# Patient Record
Sex: FEMALE | Race: WHITE | NOT HISPANIC OR LATINO | URBAN - METROPOLITAN AREA
[De-identification: names, ages, dates, MRNs, and addresses within clinical notes are randomized per-mention and may not be internally consistent; named-entity substitution may affect disease eponyms.]

---

## 2021-03-31 DIAGNOSIS — Z23 ENCOUNTER FOR IMMUNIZATION: ICD-10-CM

## 2021-04-08 ENCOUNTER — IMMUNIZATIONS (OUTPATIENT)
Dept: FAMILY MEDICINE CLINIC | Facility: HOSPITAL | Age: 61
End: 2021-04-08

## 2021-04-08 DIAGNOSIS — Z23 ENCOUNTER FOR IMMUNIZATION: Primary | ICD-10-CM

## 2021-04-08 PROCEDURE — 0001A SARS-COV-2 / COVID-19 MRNA VACCINE (PFIZER-BIONTECH) 30 MCG: CPT

## 2021-04-08 PROCEDURE — 91300 SARS-COV-2 / COVID-19 MRNA VACCINE (PFIZER-BIONTECH) 30 MCG: CPT

## 2021-05-01 ENCOUNTER — IMMUNIZATIONS (OUTPATIENT)
Dept: FAMILY MEDICINE CLINIC | Facility: HOSPITAL | Age: 61
End: 2021-05-01

## 2021-05-01 DIAGNOSIS — Z23 ENCOUNTER FOR IMMUNIZATION: Primary | ICD-10-CM

## 2021-05-01 PROCEDURE — 0002A SARS-COV-2 / COVID-19 MRNA VACCINE (PFIZER-BIONTECH) 30 MCG: CPT

## 2021-05-01 PROCEDURE — 91300 SARS-COV-2 / COVID-19 MRNA VACCINE (PFIZER-BIONTECH) 30 MCG: CPT

## 2021-06-23 ENCOUNTER — TELEPHONE (OUTPATIENT)
Dept: GASTROENTEROLOGY | Facility: CLINIC | Age: 61
End: 2021-06-23

## 2023-02-20 ENCOUNTER — APPOINTMENT (OUTPATIENT)
Dept: LAB | Facility: CLINIC | Age: 63
End: 2023-02-20

## 2023-02-20 DIAGNOSIS — I51.9 MYXEDEMA HEART DISEASE: ICD-10-CM

## 2023-02-20 DIAGNOSIS — Z01.818 OTHER SPECIFIED PRE-OPERATIVE EXAMINATION: ICD-10-CM

## 2023-02-20 DIAGNOSIS — E55.9 VITAMIN D DEFICIENCY DISEASE: ICD-10-CM

## 2023-02-20 DIAGNOSIS — E03.9 MYXEDEMA HEART DISEASE: ICD-10-CM

## 2023-02-20 DIAGNOSIS — E53.8 BIOTIN-(PROPIONYL-COA-CARBOXYLASE) LIGASE DEFICIENCY: ICD-10-CM

## 2023-02-20 DIAGNOSIS — I10 ESSENTIAL HYPERTENSION, BENIGN: ICD-10-CM

## 2023-02-20 LAB
25(OH)D3 SERPL-MCNC: 21.7 NG/ML (ref 30–100)
ALBUMIN SERPL BCP-MCNC: 3.8 G/DL (ref 3.5–5)
ALP SERPL-CCNC: 86 U/L (ref 46–116)
ALT SERPL W P-5'-P-CCNC: 20 U/L (ref 12–78)
ANION GAP SERPL CALCULATED.3IONS-SCNC: 5 MMOL/L (ref 4–13)
AST SERPL W P-5'-P-CCNC: 17 U/L (ref 5–45)
BASOPHILS # BLD AUTO: 0.05 THOUSANDS/ÂΜL (ref 0–0.1)
BASOPHILS NFR BLD AUTO: 1 % (ref 0–1)
BILIRUB SERPL-MCNC: 0.63 MG/DL (ref 0.2–1)
BUN SERPL-MCNC: 16 MG/DL (ref 5–25)
CALCIUM SERPL-MCNC: 9.9 MG/DL (ref 8.3–10.1)
CHLORIDE SERPL-SCNC: 105 MMOL/L (ref 96–108)
CHOLEST SERPL-MCNC: 237 MG/DL
CO2 SERPL-SCNC: 28 MMOL/L (ref 21–32)
CREAT SERPL-MCNC: 1.31 MG/DL (ref 0.6–1.3)
EOSINOPHIL # BLD AUTO: 0.26 THOUSAND/ÂΜL (ref 0–0.61)
EOSINOPHIL NFR BLD AUTO: 4 % (ref 0–6)
ERYTHROCYTE [DISTWIDTH] IN BLOOD BY AUTOMATED COUNT: 14.2 % (ref 11.6–15.1)
GFR SERPL CREATININE-BSD FRML MDRD: 44 ML/MIN/1.73SQ M
GLUCOSE P FAST SERPL-MCNC: 83 MG/DL (ref 65–99)
HCT VFR BLD AUTO: 40.7 % (ref 34.8–46.1)
HDLC SERPL-MCNC: 55 MG/DL
HGB BLD-MCNC: 12.8 G/DL (ref 11.5–15.4)
IMM GRANULOCYTES # BLD AUTO: 0.01 THOUSAND/UL (ref 0–0.2)
IMM GRANULOCYTES NFR BLD AUTO: 0 % (ref 0–2)
LDLC SERPL CALC-MCNC: 156 MG/DL (ref 0–100)
LYMPHOCYTES # BLD AUTO: 1.73 THOUSANDS/ÂΜL (ref 0.6–4.47)
LYMPHOCYTES NFR BLD AUTO: 30 % (ref 14–44)
MCH RBC QN AUTO: 28.3 PG (ref 26.8–34.3)
MCHC RBC AUTO-ENTMCNC: 31.4 G/DL (ref 31.4–37.4)
MCV RBC AUTO: 90 FL (ref 82–98)
MONOCYTES # BLD AUTO: 0.36 THOUSAND/ÂΜL (ref 0.17–1.22)
MONOCYTES NFR BLD AUTO: 6 % (ref 4–12)
NEUTROPHILS # BLD AUTO: 3.44 THOUSANDS/ÂΜL (ref 1.85–7.62)
NEUTS SEG NFR BLD AUTO: 59 % (ref 43–75)
NONHDLC SERPL-MCNC: 182 MG/DL
NRBC BLD AUTO-RTO: 0 /100 WBCS
PLATELET # BLD AUTO: 308 THOUSANDS/UL (ref 149–390)
PMV BLD AUTO: 10.7 FL (ref 8.9–12.7)
POTASSIUM SERPL-SCNC: 4.3 MMOL/L (ref 3.5–5.3)
PROT SERPL-MCNC: 7.3 G/DL (ref 6.4–8.4)
RBC # BLD AUTO: 4.53 MILLION/UL (ref 3.81–5.12)
SODIUM SERPL-SCNC: 138 MMOL/L (ref 135–147)
TRIGL SERPL-MCNC: 128 MG/DL
TSH SERPL DL<=0.05 MIU/L-ACNC: 2.96 UIU/ML (ref 0.45–4.5)
VIT B12 SERPL-MCNC: 291 PG/ML (ref 100–900)
WBC # BLD AUTO: 5.85 THOUSAND/UL (ref 4.31–10.16)

## 2024-08-16 ENCOUNTER — APPOINTMENT (OUTPATIENT)
Dept: LAB | Facility: CLINIC | Age: 64
End: 2024-08-16
Payer: COMMERCIAL

## 2024-08-16 DIAGNOSIS — I10 ESSENTIAL HYPERTENSION, MALIGNANT: ICD-10-CM

## 2024-08-16 DIAGNOSIS — E55.9 AVITAMINOSIS D: ICD-10-CM

## 2024-08-16 DIAGNOSIS — N39.0 URINARY TRACT INFECTION WITHOUT HEMATURIA, SITE UNSPECIFIED: ICD-10-CM

## 2024-08-16 DIAGNOSIS — I51.9 MYXEDEMA HEART DISEASE: ICD-10-CM

## 2024-08-16 DIAGNOSIS — E03.9 MYXEDEMA HEART DISEASE: ICD-10-CM

## 2024-08-16 DIAGNOSIS — E53.8 BIOTIN-(PROPIONYL-COA-CARBOXYLASE) LIGASE DEFICIENCY: ICD-10-CM

## 2024-08-16 DIAGNOSIS — E78.5 HYPERLIPIDEMIA, UNSPECIFIED HYPERLIPIDEMIA TYPE: ICD-10-CM

## 2024-08-16 LAB
25(OH)D3 SERPL-MCNC: 80.9 NG/ML (ref 30–100)
ALBUMIN SERPL BCG-MCNC: 4.2 G/DL (ref 3.5–5)
ALP SERPL-CCNC: 86 U/L (ref 34–104)
ALT SERPL W P-5'-P-CCNC: 21 U/L (ref 7–52)
ANION GAP SERPL CALCULATED.3IONS-SCNC: 7 MMOL/L (ref 4–13)
AST SERPL W P-5'-P-CCNC: 23 U/L (ref 13–39)
BACTERIA UR QL AUTO: ABNORMAL /HPF
BILIRUB SERPL-MCNC: 0.64 MG/DL (ref 0.2–1)
BILIRUB UR QL STRIP: NEGATIVE
BUN SERPL-MCNC: 21 MG/DL (ref 5–25)
CALCIUM SERPL-MCNC: 10.1 MG/DL (ref 8.4–10.2)
CHLORIDE SERPL-SCNC: 102 MMOL/L (ref 96–108)
CHOLEST SERPL-MCNC: 144 MG/DL
CLARITY UR: CLEAR
CO2 SERPL-SCNC: 31 MMOL/L (ref 21–32)
COLOR UR: ABNORMAL
CREAT SERPL-MCNC: 1.18 MG/DL (ref 0.6–1.3)
GFR SERPL CREATININE-BSD FRML MDRD: 49 ML/MIN/1.73SQ M
GLUCOSE P FAST SERPL-MCNC: 78 MG/DL (ref 65–99)
GLUCOSE UR STRIP-MCNC: NEGATIVE MG/DL
HDLC SERPL-MCNC: 57 MG/DL
HGB UR QL STRIP.AUTO: ABNORMAL
KETONES UR STRIP-MCNC: NEGATIVE MG/DL
LDLC SERPL CALC-MCNC: 58 MG/DL (ref 0–100)
LEUKOCYTE ESTERASE UR QL STRIP: NEGATIVE
MUCOUS THREADS UR QL AUTO: ABNORMAL
NITRITE UR QL STRIP: NEGATIVE
NON-SQ EPI CELLS URNS QL MICRO: ABNORMAL /HPF
NONHDLC SERPL-MCNC: 87 MG/DL
PH UR STRIP.AUTO: 6 [PH]
POTASSIUM SERPL-SCNC: 3.6 MMOL/L (ref 3.5–5.3)
PROT SERPL-MCNC: 7.3 G/DL (ref 6.4–8.4)
PROT UR STRIP-MCNC: ABNORMAL MG/DL
RBC #/AREA URNS AUTO: ABNORMAL /HPF
SODIUM SERPL-SCNC: 140 MMOL/L (ref 135–147)
SP GR UR STRIP.AUTO: 1.02 (ref 1–1.03)
TRIGL SERPL-MCNC: 145 MG/DL
TSH SERPL DL<=0.05 MIU/L-ACNC: 1.86 UIU/ML (ref 0.45–4.5)
UROBILINOGEN UR STRIP-ACNC: <2 MG/DL
VIT B12 SERPL-MCNC: 849 PG/ML (ref 180–914)
WBC #/AREA URNS AUTO: ABNORMAL /HPF

## 2024-08-16 PROCEDURE — 84443 ASSAY THYROID STIM HORMONE: CPT

## 2024-08-16 PROCEDURE — 82607 VITAMIN B-12: CPT

## 2024-08-16 PROCEDURE — 36415 COLL VENOUS BLD VENIPUNCTURE: CPT

## 2024-08-16 PROCEDURE — 80053 COMPREHEN METABOLIC PANEL: CPT

## 2024-08-16 PROCEDURE — 80061 LIPID PANEL: CPT

## 2024-08-16 PROCEDURE — 81001 URINALYSIS AUTO W/SCOPE: CPT

## 2024-08-16 PROCEDURE — 82306 VITAMIN D 25 HYDROXY: CPT

## 2024-11-13 ENCOUNTER — APPOINTMENT (OUTPATIENT)
Dept: LAB | Facility: CLINIC | Age: 64
End: 2024-11-13
Payer: COMMERCIAL

## 2024-11-13 DIAGNOSIS — E78.5 HYPERLIPIDEMIA, UNSPECIFIED HYPERLIPIDEMIA TYPE: ICD-10-CM

## 2024-11-13 DIAGNOSIS — I10 ESSENTIAL HYPERTENSION, MALIGNANT: ICD-10-CM

## 2024-11-13 LAB
ALBUMIN SERPL BCG-MCNC: 3.8 G/DL (ref 3.5–5)
ALP SERPL-CCNC: 61 U/L (ref 34–104)
ALT SERPL W P-5'-P-CCNC: 15 U/L (ref 7–52)
ANION GAP SERPL CALCULATED.3IONS-SCNC: 7 MMOL/L (ref 4–13)
AST SERPL W P-5'-P-CCNC: 21 U/L (ref 13–39)
BILIRUB SERPL-MCNC: 0.61 MG/DL (ref 0.2–1)
BUN SERPL-MCNC: 17 MG/DL (ref 5–25)
CALCIUM SERPL-MCNC: 9.1 MG/DL (ref 8.4–10.2)
CHLORIDE SERPL-SCNC: 103 MMOL/L (ref 96–108)
CHOLEST SERPL-MCNC: 109 MG/DL (ref ?–200)
CO2 SERPL-SCNC: 31 MMOL/L (ref 21–32)
CREAT SERPL-MCNC: 1.27 MG/DL (ref 0.6–1.3)
GFR SERPL CREATININE-BSD FRML MDRD: 45 ML/MIN/1.73SQ M
GLUCOSE P FAST SERPL-MCNC: 94 MG/DL (ref 65–99)
HDLC SERPL-MCNC: 43 MG/DL
LDLC SERPL CALC-MCNC: 45 MG/DL (ref 0–100)
NONHDLC SERPL-MCNC: 66 MG/DL
POTASSIUM SERPL-SCNC: 4 MMOL/L (ref 3.5–5.3)
PROT SERPL-MCNC: 6.1 G/DL (ref 6.4–8.4)
SODIUM SERPL-SCNC: 141 MMOL/L (ref 135–147)
TRIGL SERPL-MCNC: 103 MG/DL (ref ?–150)

## 2024-11-13 PROCEDURE — 80061 LIPID PANEL: CPT

## 2024-11-13 PROCEDURE — 36415 COLL VENOUS BLD VENIPUNCTURE: CPT

## 2024-11-13 PROCEDURE — 80053 COMPREHEN METABOLIC PANEL: CPT

## 2025-01-15 ENCOUNTER — TELEPHONE (OUTPATIENT)
Dept: GASTROENTEROLOGY | Facility: CLINIC | Age: 65
End: 2025-01-15

## 2025-01-15 ENCOUNTER — CONSULT (OUTPATIENT)
Dept: GASTROENTEROLOGY | Facility: CLINIC | Age: 65
End: 2025-01-15
Payer: COMMERCIAL

## 2025-01-15 VITALS
BODY MASS INDEX: 29.18 KG/M2 | DIASTOLIC BLOOD PRESSURE: 86 MMHG | SYSTOLIC BLOOD PRESSURE: 184 MMHG | HEIGHT: 66 IN | WEIGHT: 181.6 LBS

## 2025-01-15 DIAGNOSIS — K21.9 GASTROESOPHAGEAL REFLUX DISEASE, UNSPECIFIED WHETHER ESOPHAGITIS PRESENT: Primary | ICD-10-CM

## 2025-01-15 DIAGNOSIS — Z12.11 SCREENING FOR COLON CANCER: ICD-10-CM

## 2025-01-15 DIAGNOSIS — K44.9 HIATAL HERNIA: ICD-10-CM

## 2025-01-15 PROCEDURE — 99204 OFFICE O/P NEW MOD 45 MIN: CPT | Performed by: INTERNAL MEDICINE

## 2025-01-15 RX ORDER — LOSARTAN POTASSIUM 100 MG/1
100 TABLET ORAL DAILY
COMMUNITY

## 2025-01-15 RX ORDER — SODIUM CHLORIDE, SODIUM LACTATE, POTASSIUM CHLORIDE, CALCIUM CHLORIDE 600; 310; 30; 20 MG/100ML; MG/100ML; MG/100ML; MG/100ML
125 INJECTION, SOLUTION INTRAVENOUS CONTINUOUS
OUTPATIENT
Start: 2025-01-15

## 2025-01-15 RX ORDER — AMLODIPINE BESYLATE 2.5 MG/1
2.5 TABLET ORAL DAILY
COMMUNITY

## 2025-01-15 RX ORDER — FAMOTIDINE 20 MG/1
20 TABLET, FILM COATED ORAL DAILY
COMMUNITY

## 2025-01-15 RX ORDER — MUPIROCIN 20 MG/G
OINTMENT TOPICAL
COMMUNITY
Start: 2024-12-17

## 2025-01-15 RX ORDER — POLYETHYLENE GLYCOL 3350, SODIUM SULFATE ANHYDROUS, SODIUM BICARBONATE, SODIUM CHLORIDE, POTASSIUM CHLORIDE 236; 22.74; 6.74; 5.86; 2.97 G/4L; G/4L; G/4L; G/4L; G/4L
4000 POWDER, FOR SOLUTION ORAL ONCE
Qty: 4000 ML | Refills: 0 | Status: SHIPPED | OUTPATIENT
Start: 2025-01-15 | End: 2025-01-15

## 2025-01-15 NOTE — TELEPHONE ENCOUNTER
Scheduled date of combo (as of today): 2/26/25  Physician performing combo: BERRY  Location of combo: [facility]  Bowel prep reviewed with patient: Golytely  Instructions reviewed with patient by: ESTEBAN  Clearances: N

## 2025-01-15 NOTE — PROGRESS NOTES
Name: Nick Mcknight      : 1960      MRN: [id number]  Encounter Provider: Danny Muñoz DO  Encounter Date: 1/15/2025   Encounter department: [facility]  :  Assessment & Plan  Gastroesophageal reflux disease, unspecified whether esophagitis present  Longstanding complaint.  Initially took an H2 blocker, added omeprazole 4 or 5 years ago.  Tried to stop omeprazole in the setting of increased creatinine, but had immediate and severe recurrence of symptoms    Will plan upper endoscopy to assess for erosive esophagitis and Alves's.  Pending results we can attempt tapering off the PPI, likely with overlapping famotidine 40 mg twice daily    Hiatal hernia  Identified on recent chest x-ray.  Will assess further on upcoming EGD       Screening for colon cancer  No prior colon cancer screening.  Had flex sig at age 15 for constipation  Orders:    Colonoscopy; Future    polyethylene glycol (Golytely) 4000 mL solution; Take 4,000 mL by mouth once for 1 dose Take 4000 mL by mouth once for 1 dose. Use as directed        History of Present Illness   HPI  Nick Mcknight is a 64 y.o. female who presents for evaluation of chronic reflux and to discuss colon cancer screening.  She has had heartburn for several years.  She was initially treated with Pepcid then transition to omeprazole for 5 years ago.  About 3 years ago she added a bedtime dose of famotidine.  When on these medications symptoms are well-controlled.  Prior labs showed an elevated creatinine show she tried stopping the omeprazole but had immediate flare of symptoms with regurgitation and vomiting.  Symptoms were more severe than those that prompted the medication initially.  She is now back on the meds and asymptomatic.  She avoids acidic foods and avoid laying down shortly after eating.  Chest x-ray 2024 showed a moderate-sized hiatal hernia without obstruction noted    She reports lifelong issues  "with chronic constipation.  Increasing fluid intake did not help.  She currently takes a stool softener daily and a laxative every few weeks with good results.  She denies any rectal bleeding.    History obtained from: patient    Review of Systems   All other systems reviewed and are negative.    Medical History Reviewed by provider this encounter:     .  Current Outpatient Medications on File Prior to Visit   Medication Sig Dispense Refill    amLODIPine (NORVASC) 2.5 mg tablet Take 2.5 mg by mouth daily      famotidine (PEPCID) 20 mg tablet Take 20 mg by mouth daily      losartan (COZAAR) 100 MG tablet Take 100 mg by mouth daily      mupirocin (BACTROBAN) 2 % ointment PLEASE SEE ATTACHED FOR DETAILED DIRECTIONS      omeprazole (PriLOSEC) 20 mg delayed release capsule        No current facility-administered medications on file prior to visit.         Objective   BP (!) 184/86   Ht 5' 6\" (1.676 m)   Wt 82.4 kg (181 lb 9.6 oz)   BMI 29.31 kg/m²      Physical Exam  Constitutional:       Appearance: Normal appearance.   HENT:      Head: Normocephalic and atraumatic.      Nose: Nose normal.   Eyes:      Extraocular Movements: Extraocular movements intact.      Conjunctiva/sclera: Conjunctivae normal.   Cardiovascular:      Rate and Rhythm: Normal rate and regular rhythm.   Pulmonary:      Effort: Pulmonary effort is normal.      Breath sounds: Normal breath sounds.   Abdominal:      General: Abdomen is flat. Bowel sounds are normal. There is no distension.      Palpations: Abdomen is soft. There is no mass.      Tenderness: There is no guarding.   Musculoskeletal:         General: Normal range of motion.      Cervical back: Normal range of motion.   Skin:     General: Skin is warm and dry.   Neurological:      General: No focal deficit present.      Mental Status: She is alert.   Psychiatric:         Mood and Affect: Mood normal.         Behavior: Behavior normal.           "

## 2025-01-15 NOTE — PROGRESS NOTES
PT Evaluation     Today's date: 2025  Patient name: Nick Mcknight  : 1960  MRN: [id number]  Referring provider: Alejandro Tucker MD  Dx:   Encounter Diagnosis     ICD-10-CM    1. Osteoarthritis of right knee, unspecified osteoarthritis type  M17.11           Start Time: 0730  Stop Time: 0810  Total time in clinic (min): 40 minutes    Assessment  Impairments: abnormal gait, abnormal muscle firing, abnormal or restricted ROM, abnormal movement, activity intolerance, impaired physical strength, pain with function, poor body mechanics and activity limitations    Assessment details: Patient is a 64 y.o. female presenting prior to scheduled R TKA with date of surgery 25. Completed preoperative examination and established initial HEP with goal of maximizing mobility and strength in order to improve postoperative outcome. Answered all patient questions to satisfaction and educated patient regarding surgical procedure, prognosis, and plan of care. Plan to resume treatment postoperatively.         Prognosis: good    Goals  Impairment Goals: 4-6 weeks  - Patient to decrease pain to 0/10  - Patient to improve knee PROM to WFL  - Patient to increase knee strength to 4/5 throughout  - Patient to increase hip strength to 4/5 throughout    Functional Goals: by discharge  - Patient to discharge to independent HEP  - Patient to improve knee AROM to WFL  - Patient to improve subjective functional level to 80%  - Patient to ambulate in home and community without increased pain or difficulty   - Patient to ascend and descend stairs without increased pain or difficulty  - Patient to complete sit to stand transfers without increased pain or difficulty  - Patient to return to work      Plan  Patient would benefit from: skilled physical therapy  Planned modality interventions: cryotherapy, TENS and thermotherapy: hydrocollator packs    Planned therapy interventions: flexibility, home exercise program, joint mobilization,  manual therapy, neuromuscular re-education, patient education, strengthening, stretching, therapeutic activities, therapeutic exercise and functional ROM exercises    Treatment plan discussed with: patient  Plan details: Resume treatment postoperatively        Subjective Evaluation    History of Present Illness  Mechanism of injury: HISTORY OF PRESENT ILLNESS: Patient presents prior to scheduled R TKA 25. She had completed prior conservative treatment including CSI and gel injections.   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)  FUNCTIONAL LIMITATIONS: stair navigation, carrying, sit to stand from low chairs  SUBJECTIVE FUNCTIONAL LEVEL: 60%  PATIENT GOAL: I want to get back to normal life  Pain  Current pain ratin  At best pain ratin  At worst pain ratin  Location: R knee          Objective     Active Range of Motion   Left Knee   Hyperextension  Flexion: 125 degrees   Extension: 5 degrees     Right Knee   Hyperextension   Flexion: 118 degrees   Extension: 1 degrees     Strength/Myotome Testing     Left Hip   Planes of Motion   Flexion: 5  Abduction: 4+    Right Hip   Planes of Motion   Flexion: 5  Abduction: 4+    Left Knee   Flexion: 4+  Extension: 4+    Right Knee   Flexion: 4+  Extension: 4+    Left Ankle/Foot   Dorsiflexion: 5    Right Ankle/Foot   Dorsiflexion: 5             Diagnosis: R TKA 25   Precautions: HTN   Primary impairments: R knee AROM/PROM deficits, R knee strength deficits, and gait dysfunction   *asterisks by exercise = given for HEP           Manuals        R knee PROM        Tibiofemoral and patellar mobilizations                                There Ex        Rec bike        Heel slides        Gastroc strap stretch        Supine knee ext prop                                                Neuro Re-Ed         Quad sets        SAQ        Supine SLR *  X 20       Bridges        S/L hip abd *  X 20       Prone hip ext        Heel/toe raises        Band TKE        Mini squats                                Re-evaluation             Ther Act/Gait                                         Modalities             CP prn.

## 2025-01-16 ENCOUNTER — EVALUATION (OUTPATIENT)
Dept: PHYSICAL THERAPY | Facility: REHABILITATION | Age: 65
End: 2025-01-16
Payer: COMMERCIAL

## 2025-01-16 DIAGNOSIS — M17.11 OSTEOARTHRITIS OF RIGHT KNEE, UNSPECIFIED OSTEOARTHRITIS TYPE: Primary | ICD-10-CM

## 2025-01-16 PROCEDURE — 97161 PT EVAL LOW COMPLEX 20 MIN: CPT | Performed by: PHYSICAL THERAPIST

## 2025-01-16 PROCEDURE — 97112 NEUROMUSCULAR REEDUCATION: CPT | Performed by: PHYSICAL THERAPIST

## 2025-01-16 NOTE — LETTER
2025    Alejandro Tucker MD  [address] [place] [address]    Patient: Nick Mcknight   YOB: 1960   Date of Visit: 2025     Encounter Diagnosis     ICD-10-CM    1. Osteoarthritis of right knee, unspecified osteoarthritis type  M17.11           Dear Dr. Tucker:    Thank you for your recent referral of Nick Mcknight. Please review the attached evaluation summary from Nick's recent visit.     Please verify that you agree with the plan of care by signing the attached order.     If you have any questions or concerns, please do not hesitate to call.     I sincerely appreciate the opportunity to share in the care of one of your patients and hope to have another opportunity to work with you in the near future.       Sincerely,    Harry Gómez, PT      Referring Provider:      I certify that I have read the below Plan of Care and certify the need for these services furnished under this plan of treatment while under my care.                    Alejandro Tucker MD  [address] [place] [address]  Via Fax: [phone]          PT Evaluation     Today's date: 2025  Patient name: Nick Mcknight  : 1960  MRN: [id number]  Referring provider: Alejandro Tucker MD  Dx:   Encounter Diagnosis     ICD-10-CM    1. Osteoarthritis of right knee, unspecified osteoarthritis type  M17.11           Start Time: 0730  Stop Time: 0810  Total time in clinic (min): 40 minutes    Assessment  Impairments: abnormal gait, abnormal muscle firing, abnormal or restricted ROM, abnormal movement, activity intolerance, impaired physical strength, pain with function, poor body mechanics and activity limitations    Assessment details: Patient is a 64 y.o. female presenting prior to scheduled R TKA with date of surgery 25. Completed preoperative examination and established initial HEP with goal of maximizing mobility and strength in order to improve postoperative outcome. Answered all patient  questions to satisfaction and educated patient regarding surgical procedure, prognosis, and plan of care. Plan to resume treatment postoperatively.         Prognosis: good    Goals  Impairment Goals: 4-6 weeks  - Patient to decrease pain to 0/10  - Patient to improve knee PROM to WFL  - Patient to increase knee strength to 4/5 throughout  - Patient to increase hip strength to 4/5 throughout    Functional Goals: by discharge  - Patient to discharge to independent HEP  - Patient to improve knee AROM to WFL  - Patient to improve subjective functional level to 80%  - Patient to ambulate in home and community without increased pain or difficulty   - Patient to ascend and descend stairs without increased pain or difficulty  - Patient to complete sit to stand transfers without increased pain or difficulty  - Patient to return to work      Plan  Patient would benefit from: skilled physical therapy  Planned modality interventions: cryotherapy, TENS and thermotherapy: hydrocollator packs    Planned therapy interventions: flexibility, home exercise program, joint mobilization, manual therapy, neuromuscular re-education, patient education, strengthening, stretching, therapeutic activities, therapeutic exercise and functional ROM exercises    Treatment plan discussed with: patient  Plan details: Resume treatment postoperatively        Subjective Evaluation    History of Present Illness  Mechanism of injury: HISTORY OF PRESENT ILLNESS: Patient presents prior to scheduled R TKA 1/20/25. She had completed prior conservative treatment including CSI and gel injections.   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)  FUNCTIONAL LIMITATIONS: stair navigation, carrying, sit to stand from low chairs  SUBJECTIVE FUNCTIONAL LEVEL: 60%  PATIENT GOAL: I want to get back to normal  life  Pain  Current pain ratin  At best pain ratin  At worst pain ratin  Location: R knee          Objective     Active Range of Motion   Left Knee   Hyperextension  Flexion: 125 degrees   Extension: 5 degrees     Right Knee   Hyperextension   Flexion: 118 degrees   Extension: 1 degrees     Strength/Myotome Testing     Left Hip   Planes of Motion   Flexion: 5  Abduction: 4+    Right Hip   Planes of Motion   Flexion: 5  Abduction: 4+    Left Knee   Flexion: 4+  Extension: 4+    Right Knee   Flexion: 4+  Extension: 4+    Left Ankle/Foot   Dorsiflexion: 5    Right Ankle/Foot   Dorsiflexion: 5             Diagnosis: R TKA 25   Precautions: HTN   Primary impairments: R knee AROM/PROM deficits, R knee strength deficits, and gait dysfunction   *asterisks by exercise = given for HEP           Manuals        R knee PROM        Tibiofemoral and patellar mobilizations                                There Ex        Rec bike        Heel slides        Gastroc strap stretch        Supine knee ext prop                                                Neuro Re-Ed        Quad sets        SAQ        Supine SLR *  X 20       Bridges        S/L hip abd *  X 20       Prone hip ext        Heel/toe raises        Band TKE        Mini squats                                Re-evaluation             Ther Act/Gait                                         Modalities             CP prn.

## 2025-01-22 ENCOUNTER — OFFICE VISIT (OUTPATIENT)
Dept: PHYSICAL THERAPY | Facility: REHABILITATION | Age: 65
End: 2025-01-22
Payer: COMMERCIAL

## 2025-01-22 DIAGNOSIS — M17.11 OSTEOARTHRITIS OF RIGHT KNEE, UNSPECIFIED OSTEOARTHRITIS TYPE: Primary | ICD-10-CM

## 2025-01-22 DIAGNOSIS — Z96.651 HISTORY OF ARTHROPLASTY OF RIGHT KNEE: ICD-10-CM

## 2025-01-22 PROCEDURE — 97110 THERAPEUTIC EXERCISES: CPT | Performed by: PHYSICAL THERAPIST

## 2025-01-22 PROCEDURE — 97164 PT RE-EVAL EST PLAN CARE: CPT | Performed by: PHYSICAL THERAPIST

## 2025-01-22 NOTE — PROGRESS NOTES
PT Evaluation     Today's date: 2025  Patient name: Nick Mcknight  : 1960  MRN: [id number]  Referring provider: Alejandro Tucker MD  Dx:   Encounter Diagnosis     ICD-10-CM    1. Osteoarthritis of right knee, unspecified osteoarthritis type  M17.11       2. History of arthroplasty of right knee  Z96.651           Start Time: 1705  Stop Time: 1730  Total time in clinic (min): 25 minutes    Assessment  Impairments: abnormal gait, abnormal muscle firing, abnormal or restricted ROM, abnormal movement, activity intolerance, impaired physical strength, pain with function, poor body mechanics and activity limitations    Assessment details: Patient is a 64 y.o. female presenting s/p R TKA with date of surgery 25. Resulting postoperative impairments include R knee AROM/PROM deficits, R knee strength deficits, and gait dysfunction. As a result of impairments patient experiences limitations with functional/daily activities including ambulating with RW, step to step stair navigation, unable to drive, out of work, sit to stand transfers, car transfers, and playing with grandchildren. Precautions, surgical protocol, and signs/symptoms of infection were reviewed with patient who expressed verbal understanding. Patient has the above listed impairments and will benefit from skilled PT to improve deficits to return to prior level of function.           Prognosis: good    Goals  Impairment Goals: 4-6 weeks  - Patient to decrease pain to 0/10  - Patient to improve knee PROM to WFL  - Patient to increase knee strength to 4/5 throughout  - Patient to increase hip strength to 4/5 throughout    Functional Goals: by discharge  - Patient to discharge to independent [facility]  - Patient to improve knee AROM to WFL  - Patient to improve subjective functional level to 80%  - Patient to ambulate in home and community without increased pain or difficulty   - Patient to ascend and descend stairs without increased pain or difficulty  -  Patient to complete sit to stand transfers without increased pain or difficulty  - Patient to return to playing with grandchildren      Plan  Patient would benefit from: skilled physical therapy  Planned modality interventions: cryotherapy, TENS and thermotherapy: hydrocollator packs    Planned therapy interventions: flexibility, home exercise program, joint mobilization, manual therapy, neuromuscular re-education, patient education, strengthening, stretching, therapeutic activities, therapeutic exercise and functional ROM exercises    Frequency: 2x week  Duration in weeks: 8  Treatment plan discussed with: patient      Subjective Evaluation    History of Present Illness  Mechanism of injury: HISTORY OF PRESENT ILLNESS: Patient presents s/p R TKA 25. She was discharged home next day and is ambulating with RW.   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)  FUNCTIONAL LIMITATIONS: ambulating with RW, step to step stair navigation, unable to drive, out of work, sit to stand transfers, car transfers, and playing with grandchildren  SUBJECTIVE FUNCTIONAL LEVEL: 40%  PATIENT GOAL: I want to get back to normal life  Pain  Current pain ratin  At best pain ratin  At worst pain rating: 10  Location: R knee        Objective     Active Range of Motion   Left Knee   Hyperextension  Flexion: 125 degrees   Extension: 5 degrees     Right Knee   Flexion: 77 degrees   Extension: -5 degrees     Strength/Myotome Testing     Left Hip   Planes of Motion   Flexion: 5  Abduction: 4+    Left Knee   Flexion: 4+  Extension: 4+    Right Knee   Quadriceps contraction: fair    Left Ankle/Foot   Dorsiflexion: 5    Right Ankle/Foot   Dorsiflexion: 5      Flowsheet Rows      Flowsheet Row Most Recent Value   PT/OT G-Codes    Current Score 52   Projected Score 76               Diagnosis: s/p R TKA  "1/20/25   Precautions: HTN   Primary impairments: R knee AROM/PROM deficits, R knee strength deficits, and gait dysfunction   *asterisks by exercise = given for HEP    1/16 1/22      Manuals        R knee PROM        Tibiofemoral and patellar mobilizations                                There Ex        Rec bike         Heel slides *   5\" x 10      Gastroc strap stretch        Supine knee ext prop                                                Neuro Re-Ed        Quad sets *   5\" x 10      SAQ        Supine SLR  X 20       Bridges        S/L hip abd  X 20       Prone hip ext        Heel/toe raises        Band TKE        Mini squats                                Re-evaluation    CM         Ther Act/Gait                                         Modalities             CP prn.   At home                                           "

## 2025-01-27 ENCOUNTER — OFFICE VISIT (OUTPATIENT)
Dept: PHYSICAL THERAPY | Facility: REHABILITATION | Age: 65
End: 2025-01-27
Payer: COMMERCIAL

## 2025-01-27 DIAGNOSIS — M17.11 OSTEOARTHRITIS OF RIGHT KNEE, UNSPECIFIED OSTEOARTHRITIS TYPE: Primary | ICD-10-CM

## 2025-01-27 DIAGNOSIS — Z96.651 HISTORY OF ARTHROPLASTY OF RIGHT KNEE: ICD-10-CM

## 2025-01-27 PROCEDURE — 97140 MANUAL THERAPY 1/> REGIONS: CPT

## 2025-01-27 PROCEDURE — 97110 THERAPEUTIC EXERCISES: CPT

## 2025-01-27 NOTE — PROGRESS NOTES
"Daily Note     Today's date: 2025  Patient name: Nick Mcknight  : 1960  MRN: [id number]  Referring provider: Alejandro Tucker MD  Dx:   Encounter Diagnosis     ICD-10-CM    1. Osteoarthritis of right knee, unspecified osteoarthritis type  M17.11       2. History of arthroplasty of right knee  Z96.651           Start Time: 1701  Stop Time: 1740  Total time in clinic (min): 39 minutes    Subjective: Pt reports she is a bit sore and stiff today. Chief complaint is having pain below patella today. Has been using the rom tech bike but only did it once on Thursday and Friday as she spiked a fever, but was able to resume to doing so 3x a day on the weekend. Pain is a bout 3/10.       Objective: See treatment diary below      Assessment: Tolerated treatment well. Focused on mat table ex's and manuals today due to pt increased pain/soreness/swelling. Patient demonstrated fatigue post treatment, exhibited good technique with therapeutic exercises, and would benefit from continued PT      Plan: Continue per plan of care.        Diagnosis: s/p R TKA 25   Precautions: HTN   Primary impairments: R knee AROM/PROM deficits, R knee strength deficits, and gait dysfunction   *asterisks by exercise = given for HEP         Manuals        R knee PROM   10' with effleurage for swelling     Tibiofemoral and patellar mobilizations   5'                               There Ex        Rec bike         Heel slides *   5\" x 10 5\" 2x10      Gastroc strap stretch   5 x 20\" with strap     Supine knee ext prop                                                Neuro Re-Ed        Quad sets *   5\" x 10 5\" 2x10      SAQ        Supine SLR  X 20  X 20     Bridges        S/L hip abd  X 20  X 20      Prone hip ext        Heel/toe raises        Band TKE        Mini squats                                Re-evaluation    CM         Ther Act/Gait                                         Modalities             CP prn.   At home @ home   "

## 2025-01-29 ENCOUNTER — OFFICE VISIT (OUTPATIENT)
Dept: PHYSICAL THERAPY | Facility: REHABILITATION | Age: 65
End: 2025-01-29
Payer: COMMERCIAL

## 2025-01-29 DIAGNOSIS — Z96.651 HISTORY OF ARTHROPLASTY OF RIGHT KNEE: ICD-10-CM

## 2025-01-29 DIAGNOSIS — M17.11 OSTEOARTHRITIS OF RIGHT KNEE, UNSPECIFIED OSTEOARTHRITIS TYPE: Primary | ICD-10-CM

## 2025-01-29 PROCEDURE — 97140 MANUAL THERAPY 1/> REGIONS: CPT | Performed by: PHYSICAL THERAPIST

## 2025-01-29 PROCEDURE — 97112 NEUROMUSCULAR REEDUCATION: CPT | Performed by: PHYSICAL THERAPIST

## 2025-01-29 PROCEDURE — 97110 THERAPEUTIC EXERCISES: CPT | Performed by: PHYSICAL THERAPIST

## 2025-01-29 NOTE — PROGRESS NOTES
"Daily Note     Today's date: 2025  Patient name: Nick Mcknight  : 1960  MRN: [id number]  Referring provider: Alejandro Tucker MD  Dx:   Encounter Diagnosis     ICD-10-CM    1. Osteoarthritis of right knee, unspecified osteoarthritis type  M17.11       2. History of arthroplasty of right knee  Z96.651           Start Time: 1700  Stop Time: 1745  Total time in clinic (min): 45 minutes    Subjective: Patient reports she has been doing well with her HEP but definitely still has swelling      Objective: See treatment diary below      Assessment: Tolerated treatment well. Patient demonstrates functional gait pattern without RW with good quadriceps control and was advised she may ambulate without AD indoors and on level terrain however to continue to utilize RW for long distances and uneven terrain. Cueing to maintain symmetrical weight shift during mini squats. Instructed patient to limit range with supine straight leg raises and to maintain full knee extension. Patient demonstrated fatigue post treatment, exhibited good technique with therapeutic exercises, and would benefit from continued PT      Plan: Continue per plan of care.        Diagnosis: s/p R TKA 25   Precautions: HTN   Primary impairments: R knee AROM/PROM deficits, R knee strength deficits, and gait dysfunction   *asterisks by exercise = given for HEP        Manuals        R knee PROM   10' with effleurage for swelling  10'    Tibiofemoral and patellar mobilizations   5'    5'                            There Ex        Rec bike       revs x 8'    Heel slides *   5\" x 10 5\" 2x10   5\" x 10    Gastroc strap stretch   5 x 20\" with strap     Supine knee ext prop     1'                                            Neuro Re-Ed        Quad sets *   5\" x 10 5\" 2x10   HEP    SAQ        Supine SLR *  X 20  X 20  X 20    Bridges        S/L hip abd  X 20  X 20   X 20    Prone hip ext        Heel/toe raises     X 15 ea    Band TKE     " "Blue 5\" x 10    Mini squats     X 10                            Re-evaluation    CM         Ther Act/Gait                                         Modalities             CP prn.   At home @ home  At home                                           "

## 2025-02-03 ENCOUNTER — OFFICE VISIT (OUTPATIENT)
Dept: PHYSICAL THERAPY | Facility: REHABILITATION | Age: 65
End: 2025-02-03
Payer: COMMERCIAL

## 2025-02-03 DIAGNOSIS — Z96.651 HISTORY OF ARTHROPLASTY OF RIGHT KNEE: ICD-10-CM

## 2025-02-03 DIAGNOSIS — M17.11 OSTEOARTHRITIS OF RIGHT KNEE, UNSPECIFIED OSTEOARTHRITIS TYPE: Primary | ICD-10-CM

## 2025-02-03 PROCEDURE — 97110 THERAPEUTIC EXERCISES: CPT | Performed by: PHYSICAL THERAPIST

## 2025-02-03 PROCEDURE — 97140 MANUAL THERAPY 1/> REGIONS: CPT | Performed by: PHYSICAL THERAPIST

## 2025-02-03 PROCEDURE — 97112 NEUROMUSCULAR REEDUCATION: CPT | Performed by: PHYSICAL THERAPIST

## 2025-02-03 NOTE — PROGRESS NOTES
"Daily Note     Today's date: 2/3/2025  Patient name: Nick Mcknight  : 1960  MRN: [id number]  Referring provider: Alejanrdo Tucker MD  Dx:   Encounter Diagnosis     ICD-10-CM    1. Osteoarthritis of right knee, unspecified osteoarthritis type  M17.11       2. History of arthroplasty of right knee  Z96.651           Start Time: 1700  Stop Time: 1745  Total time in clinic (min): 45 minutes    Subjective: Patient reports she continues to do well and her ROM and strength are coming along. She is able to go up stairs one foot over the other      Objective: See treatment diary below      Assessment: Tolerated treatment well. Symmetrical weight distribution noted with sit to stand this visit. Updated HEP to include sit to stand. Knee extension measured at -3 degrees actively and flexion AROM measured at 100 degrees. Patient demonstrated fatigue post treatment, exhibited good technique with therapeutic exercises, and would benefit from continued PT      Plan: Continue per plan of care.        Diagnosis: s/p R TKA 25   Precautions: HTN   Primary impairments: R knee AROM/PROM deficits, R knee strength deficits, and gait dysfunction   *asterisks by exercise = given for HEP     2/3   Manuals        R knee PROM   10' with effleurage for swelling  10'  10'   Tibiofemoral and patellar mobilizations   5'    5'  5'                           There Ex        Rec bike      1/2 revs x 8'  1/2 revs x 4'   Heel slides *   5\" x 10 5\" 2x10   5\" x 10  5\" x 10   Gastroc strap stretch   5 x 20\" with strap     Supine knee ext prop     1'                                            Neuro Re-Ed        Quad sets *   5\" x 10 5\" 2x10   HEP    SAQ        Supine SLR *  X 20  X 20  X 20    Bridges        S/L hip abd  X 20  X 20   X 20    Prone hip ext        Heel/toe raises     X 15 ea  X 20 ea   Band TKE     Blue 5\" x 10  Blue 10\" x 10   Sit to stand chair with foam *      X 15   Sidestepping      X 2 laps   U/L leg press  "                       Re-evaluation    CM         Ther Act/Gait                                         Modalities             CP prn.   At home @ home  At home  At home

## 2025-02-05 ENCOUNTER — APPOINTMENT (OUTPATIENT)
Dept: PHYSICAL THERAPY | Facility: REHABILITATION | Age: 65
End: 2025-02-05
Payer: COMMERCIAL

## 2025-02-10 ENCOUNTER — OFFICE VISIT (OUTPATIENT)
Dept: PHYSICAL THERAPY | Facility: REHABILITATION | Age: 65
End: 2025-02-10
Payer: COMMERCIAL

## 2025-02-10 DIAGNOSIS — Z96.651 HISTORY OF ARTHROPLASTY OF RIGHT KNEE: ICD-10-CM

## 2025-02-10 DIAGNOSIS — M17.11 OSTEOARTHRITIS OF RIGHT KNEE, UNSPECIFIED OSTEOARTHRITIS TYPE: Primary | ICD-10-CM

## 2025-02-10 PROCEDURE — 97110 THERAPEUTIC EXERCISES: CPT

## 2025-02-10 PROCEDURE — 97112 NEUROMUSCULAR REEDUCATION: CPT

## 2025-02-10 PROCEDURE — 97140 MANUAL THERAPY 1/> REGIONS: CPT

## 2025-02-10 NOTE — PROGRESS NOTES
"Daily Note     Today's date: 2/10/2025  Patient name: Nick Mcknight  : 1960  MRN: [id number]  Referring provider: Alejandro Tucker MD  Dx:   Encounter Diagnosis     ICD-10-CM    1. Osteoarthritis of right knee, unspecified osteoarthritis type  M17.11       2. History of arthroplasty of right knee  Z96.651           Start Time: 1655  Stop Time: 1740  Total time in clinic (min): 45 minutes    Subjective: Patient reports that she mild soreness and stiffness into right knee otherwise feels good. She has increased her ambulation and is using Elliptical at home. She saw Ortho this morning and was told to continue x2 weeks.       Objective: See treatment diary below      Assessment: Performed STS without foam pad with good technique and tolerance. Added TB to sidestepping and and added U/L Leg Press with good tolerance. Patient appeared to tolerate treatment well. Decreased ROM into flexion and palpable edema in posterior fossa. She demonstrates good QS but lacks full control as there is a lag with SLR. Consider step ups/downs as she reports having difficulty with step downs at home. Patient demonstrated fatigue post treatment, exhibited good technique with therapeutic exercises, and would benefit from continued PT      Plan: Continue per plan of care.  Progress treatment as tolerated.         Diagnosis: s/p R TKA 25   Precautions: HTN   Primary impairments: R knee AROM/PROM deficits, R knee strength deficits, and gait dysfunction   *asterisks by exercise = given for HEP    2/10 1/22 1/27 1/29 2/3   Manuals        R knee PROM 15 min +Edema massage, HS stretch and patella glides  10' with effleurage for swelling  10'  10'   Tibiofemoral and patellar mobilizations   5'    5'  5'                           There Ex        Rec bike L2 8 min (full rev)     1/2 revs x 8'  1/2 revs x 4'   Heel slides * 5\" x10  5\" x 10 5\" 2x10   5\" x 10  5\" x 10   Gastroc strap stretch With Strap 20\"x4  5 x 20\" with strap     Supine " "knee ext prop     1'                                            Neuro Re-Ed        Quad sets *   5\" x 10 5\" 2x10   HEP    SAQ        Supine SLR * With QS 2x10  X 20  X 20    Bridges        S/L hip abd x20  X 20   X 20    Prone hip ext        Heel/toe raises X20 ea    X 15 ea  X 20 ea   Band TKE Blue 5\" x20    Blue 5\" x 10  Blue 10\" x 10   Sit to stand chair with foam * No foam: 2x10     X 15   Sidestepping RTB 2 laps     X 2 laps   U/L leg press 55# x10                       Re-evaluation   CM         Ther Act/Gait                                      Modalities            CP prn.   At home @ home  At home  At home                                              "

## 2025-02-12 ENCOUNTER — APPOINTMENT (OUTPATIENT)
Dept: PHYSICAL THERAPY | Facility: REHABILITATION | Age: 65
End: 2025-02-12
Payer: COMMERCIAL

## 2025-02-17 ENCOUNTER — EVALUATION (OUTPATIENT)
Dept: PHYSICAL THERAPY | Facility: REHABILITATION | Age: 65
End: 2025-02-17
Payer: COMMERCIAL

## 2025-02-17 DIAGNOSIS — Z96.651 HISTORY OF ARTHROPLASTY OF RIGHT KNEE: ICD-10-CM

## 2025-02-17 DIAGNOSIS — M17.11 OSTEOARTHRITIS OF RIGHT KNEE, UNSPECIFIED OSTEOARTHRITIS TYPE: Primary | ICD-10-CM

## 2025-02-17 PROCEDURE — 97140 MANUAL THERAPY 1/> REGIONS: CPT | Performed by: PHYSICAL THERAPIST

## 2025-02-17 PROCEDURE — 97112 NEUROMUSCULAR REEDUCATION: CPT | Performed by: PHYSICAL THERAPIST

## 2025-02-17 PROCEDURE — 97110 THERAPEUTIC EXERCISES: CPT | Performed by: PHYSICAL THERAPIST

## 2025-02-17 NOTE — PROGRESS NOTES
"Daily Note     Today's date: 2025  Patient name: Nick Mcknight  : 1960  MRN: [id number]  Referring provider: Alejandro Tucker MD  Dx:   Encounter Diagnosis     ICD-10-CM    1. Osteoarthritis of right knee, unspecified osteoarthritis type  M17.11       2. History of arthroplasty of right knee  Z96.651           Start Time: 1700  Stop Time: 1745  Total time in clinic (min): 45 minutes    Subjective: Patient reports she has been doing well overall but was very sore after her last visit. She has been using the elliptical      Objective: See treatment diary below      Assessment: Tolerated treatment well. Very good knee flexion and extension AROM/PROM with interventions. Knee flexion AROM measured at 113 degrees. Patient challenged with controlled descent during forward step overs. Patient exhibited good technique with therapeutic exercises and would benefit from continued PT      Plan: Continue per plan of care.        Diagnosis: s/p R TKA 25   Precautions: HTN   Primary impairments: R knee AROM/PROM deficits, R knee strength deficits, and gait dysfunction   *asterisks by exercise = given for HEP    2/10 2/17 1/27 1/29 2/3   Manuals        R knee PROM 15 min +Edema massage, HS stretch and patella glides  10' 10' with effleurage for swelling  10'  10'   Tibiofemoral and patellar mobilizations   5' 5'    5'  5'                           There Ex        Rec bike L2 8 min (full rev)  L1 x 8'   1/2 revs x 8'  1/2 revs x 4'   Heel slides * 5\" x10  5\" 2x10   5\" x 10  5\" x 10   Gastroc strap stretch With Strap 20\"x4  5 x 20\" with strap     Supine knee ext prop     1'                                            Neuro Re-Ed        Quad sets *   5\" 2x10   HEP    SAQ        Supine SLR * With QS 2x10  X 20  X 20    Bridges        S/L hip abd x20  X 20   X 20    Prone hip ext        Heel/toe raises X20 ea    X 15 ea  X 20 ea   Band TKE Blue 5\" x20    Blue 5\" x 10  Blue 10\" x 10   Sit to stand chair with foam * No " foam: 2x10     X 15   Sidestepping RTB 2 laps     X 2 laps   U/L leg press 55# x10  55 lbs x 10      Fwd step overs   1R x 10                      Re-evaluation           Ther Act/Gait                                   Modalities

## 2025-02-19 ENCOUNTER — APPOINTMENT (OUTPATIENT)
Dept: PHYSICAL THERAPY | Facility: REHABILITATION | Age: 65
End: 2025-02-19
Payer: COMMERCIAL

## 2025-02-20 ENCOUNTER — EVALUATION (OUTPATIENT)
Dept: PHYSICAL THERAPY | Facility: REHABILITATION | Age: 65
End: 2025-02-20
Payer: COMMERCIAL

## 2025-02-20 DIAGNOSIS — M17.11 OSTEOARTHRITIS OF RIGHT KNEE, UNSPECIFIED OSTEOARTHRITIS TYPE: Primary | ICD-10-CM

## 2025-02-20 DIAGNOSIS — Z96.651 HISTORY OF ARTHROPLASTY OF RIGHT KNEE: ICD-10-CM

## 2025-02-20 PROCEDURE — 97112 NEUROMUSCULAR REEDUCATION: CPT | Performed by: PHYSICAL THERAPIST

## 2025-02-20 PROCEDURE — 97110 THERAPEUTIC EXERCISES: CPT | Performed by: PHYSICAL THERAPIST

## 2025-02-20 NOTE — PROGRESS NOTES
PT Re-Evaluation     Today's date: 2025  Patient name: Nick Mcknight  : 1960  MRN: [id number]  Referring provider: Alejandro Tucker MD  Dx:   Encounter Diagnosis     ICD-10-CM    1. Osteoarthritis of right knee, unspecified osteoarthritis type  M17.11       2. History of arthroplasty of right knee  Z96.651           Start Time: 0730  Stop Time: 0815  Total time in clinic (min): 45 minutes    Assessment  Impairments: abnormal gait, abnormal muscle firing, abnormal or restricted ROM, abnormal movement, activity intolerance, impaired physical strength, pain with function, poor body mechanics and activity limitations    Assessment details: Patient is a 64 y.o. female presenting s/p R TKA with date of surgery 25. Patient exhibits good progress toward objective and functional goals at time of reexamination. Patient exhibits improvements with ambulating without AD, reciprocal stair navigation, driving, sit to stand transfers, car transfers, and playing with grandchildren since initiating PT however continues to have limitations compared to prior level of function. Remaining functional limitations include donning socks. As a result of impairments patient has continued limitations with daily and functional activities and would benefit from continued skilled PT interventions to address these impairments in order to maximize function.              Prognosis: good    Goals  Impairment Goals: 4-6 weeks  - Patient to decrease pain to 0/10 - MET  - Patient to improve knee PROM to WFL - MET  - Patient to increase knee strength to 4/5 throughout - MET  - Patient to increase hip strength to 4/5 throughout - MET    Functional Goals: by discharge  - Patient to discharge to independent HEP - PROGRESSING  - Patient to improve knee AROM to WFL - PROGRESSING  - Patient to improve subjective functional level to 80% - MET  - Patient to ambulate in home and community without increased pain or difficulty - MET  - Patient to  ascend and descend stairs without increased pain or difficulty - MET  - Patient to complete sit to stand transfers without increased pain or difficulty - MET  - Patient to return to playing with grandchildren - MET      Plan  Patient would benefit from: skilled physical therapy  Planned modality interventions: cryotherapy, TENS and thermotherapy: hydrocollator packs    Planned therapy interventions: flexibility, home exercise program, joint mobilization, manual therapy, neuromuscular re-education, patient education, strengthening, stretching, therapeutic activities, therapeutic exercise and functional ROM exercises    Frequency: 1x week  Duration in weeks: 4  Treatment plan discussed with: patient        Subjective Evaluation    History of Present Illness  Mechanism of injury: HISTORY OF PRESENT ILLNESS: Patient presents s/p R TKA 25. Patient reports she is improving with interventions. She is ambulating without AD in home and community and has recently been able to navigate stairs with reciprocal pattern. She is back to watching her grandchildren without any limitations. She is not ramping up work until September. She is able to kneel without significant pain. Plan to reduce frequency to 1x/week beginning next week.   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   IMPROVEMENTS: ambulating without AD, reciprocal stair navigation, driving, sit to stand transfers, car transfers, and playing with grandchildren  SUBJECTIVE FUNCTIONAL LEVEL: 90%  PATIENT GOAL: I want to get back to normal life  Pain  Current pain ratin  At best pain ratin  At worst pain ratin  Location: R knee          Objective     Active Range of Motion   Left Knee   Hyperextension  Flexion: 125 degrees   Extension: 5 degrees     Right Knee   Flexion: 113 degrees   Extension: -1 degrees  "    Passive Range of Motion     Right Knee   Normal passive range of motion    Strength/Myotome Testing     Left Hip   Planes of Motion   Flexion: 5  Abduction: 4+    Right Hip   Planes of Motion   Flexion: 4+  Abduction: 4+    Left Knee   Flexion: 4+  Extension: 4+    Right Knee   Flexion: 4+  Extension: 4+  Quadriceps contraction: good    Left Ankle/Foot   Dorsiflexion: 5    Right Ankle/Foot   Dorsiflexion: 5    Additional Strength Details  R SLR: 4  L SLR: 5    Ambulation     Observational Gait     Additional Observational Gait Details  No deviations               Diagnosis: s/p R TKA 1/20/25   Precautions: HTN   Primary impairments: R knee AROM/PROM deficits, R knee strength deficits, and gait dysfunction   *asterisks by exercise = given for HEP    2/10 2/17 2/20 1/29 2/3   Manuals        R knee PROM 15 min +Edema massage, HS stretch and patella glides  10'   10'  10'   Tibiofemoral and patellar mobilizations   5'   5'  5'                           There Ex        Rec bike L2 8 min (full rev)  L1 x 8'  L1 x 8'  1/2 revs x 8'  1/2 revs x 4'   Heel slides * 5\" x10    5\" x 10  5\" x 10   Gastroc strap stretch With Strap 20\"x4       Supine knee ext prop     1'                                            Neuro Re-Ed        Quad sets     HEP    SAQ        Supine SLR * With QS 2x10    X 20    Bridges        S/L hip abd x20    X 20    Prone hip ext        Heel/toe raises X20 ea    X 15 ea  X 20 ea   Band TKE Blue 5\" x20    Blue 5\" x 10  Blue 10\" x 10   Sit to stand chair with foam * No foam: 2x10     X 15   Sidestepping RTB 2 laps     X 2 laps   U/L leg press 55# x10  55 lbs x 10      Fwd step overs   1R x 10                      Re-evaluation    CM       Ther Act/Gait                                Modalities                                                     "

## 2025-02-20 NOTE — LETTER
2025    Alejandro Tucker MD  [address] [place] [address]    Patient: Nick Mcknight   YOB: 1960   Date of Visit: 2025     Encounter Diagnosis     ICD-10-CM    1. Osteoarthritis of right knee, unspecified osteoarthritis type  M17.11       2. History of arthroplasty of right knee  Z96.651           Dear Dr. Tucker:    Thank you for your recent referral of Nick Mcknight. Please review the attached evaluation summary from Nick's recent visit.     Please verify that you agree with the plan of care by signing the attached order.     If you have any questions or concerns, please do not hesitate to call.     I sincerely appreciate the opportunity to share in the care of one of your patients and hope to have another opportunity to work with you in the near future.       Sincerely,    Harry Gómez, PT      Referring Provider:      I certify that I have read the below Plan of Care and certify the need for these services furnished under this plan of treatment while under my care.                    Alejandro Tucker MD  [address] [place] [address]  Via Fax: [phone]          PT Re-Evaluation     Today's date: 2025  Patient name: Nick Mcknight  : 1960  MRN: [id number]  Referring provider: Alejandro Tucker MD  Dx:   Encounter Diagnosis     ICD-10-CM    1. Osteoarthritis of right knee, unspecified osteoarthritis type  M17.11       2. History of arthroplasty of right knee  Z96.651           Start Time: 0730  Stop Time: 0815  Total time in clinic (min): 45 minutes    Assessment  Impairments: abnormal gait, abnormal muscle firing, abnormal or restricted ROM, abnormal movement, activity intolerance, impaired physical strength, pain with function, poor body mechanics and activity limitations    Assessment details: Patient is a 64 y.o. female presenting s/p R TKA with date of surgery 25. Patient exhibits good progress toward objective and functional goals at time of  reexamination. Patient exhibits improvements with ambulating without AD, reciprocal stair navigation, driving, sit to stand transfers, car transfers, and playing with grandchildren since initiating PT however continues to have limitations compared to prior level of function. Remaining functional limitations include donning socks. As a result of impairments patient has continued limitations with daily and functional activities and would benefit from continued skilled PT interventions to address these impairments in order to maximize function.              Prognosis: good    Goals  Impairment Goals: 4-6 weeks  - Patient to decrease pain to 0/10 - MET  - Patient to improve knee PROM to WFL - MET  - Patient to increase knee strength to 4/5 throughout - MET  - Patient to increase hip strength to 4/5 throughout - MET    Functional Goals: by discharge  - Patient to discharge to independent HEP - PROGRESSING  - Patient to improve knee AROM to WFL - PROGRESSING  - Patient to improve subjective functional level to 80% - MET  - Patient to ambulate in home and community without increased pain or difficulty - MET  - Patient to ascend and descend stairs without increased pain or difficulty - MET  - Patient to complete sit to stand transfers without increased pain or difficulty - MET  - Patient to return to playing with grandchildren - MET      Plan  Patient would benefit from: skilled physical therapy  Planned modality interventions: cryotherapy, TENS and thermotherapy: hydrocollator packs    Planned therapy interventions: flexibility, home exercise program, joint mobilization, manual therapy, neuromuscular re-education, patient education, strengthening, stretching, therapeutic activities, therapeutic exercise and functional ROM exercises    Frequency: 1x week  Duration in weeks: 4  Treatment plan discussed with: patient        Subjective Evaluation    History of Present Illness  Mechanism of injury: HISTORY OF PRESENT ILLNESS:  Patient presents s/p R TKA 25. Patient reports she is improving with interventions. She is ambulating without AD in home and community and has recently been able to navigate stairs with reciprocal pattern. She is back to watching her grandchildren without any limitations. She is not ramping up work until September. She is able to kneel without significant pain. Plan to reduce frequency to 1x/week beginning next week.   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   IMPROVEMENTS: ambulating without AD, reciprocal stair navigation, driving, sit to stand transfers, car transfers, and playing with grandchildren  SUBJECTIVE FUNCTIONAL LEVEL: 90%  PATIENT GOAL: I want to get back to normal life  Pain  Current pain ratin  At best pain ratin  At worst pain ratin  Location: R knee          Objective     Active Range of Motion   Left Knee   Hyperextension  Flexion: 125 degrees   Extension: 5 degrees     Right Knee   Flexion: 113 degrees   Extension: -1 degrees     Passive Range of Motion     Right Knee   Normal passive range of motion    Strength/Myotome Testing     Left Hip   Planes of Motion   Flexion: 5  Abduction: 4+    Right Hip   Planes of Motion   Flexion: 4+  Abduction: 4+    Left Knee   Flexion: 4+  Extension: 4+    Right Knee   Flexion: 4+  Extension: 4+  Quadriceps contraction: good    Left Ankle/Foot   Dorsiflexion: 5    Right Ankle/Foot   Dorsiflexion: 5    Additional Strength Details  R SLR: 4  L SLR: 5    Ambulation     Observational Gait     Additional Observational Gait Details  No deviations               Diagnosis: s/p R TKA 25   Precautions: HTN   Primary impairments: R knee AROM/PROM deficits, R knee strength deficits, and gait dysfunction   *asterisks by exercise = given for HEP    2/10 2/17 2/20 1/29 2/3   Manuals        R knee PROM 15 min  "+Edema massage, HS stretch and patella glides  10'   10'  10'   Tibiofemoral and patellar mobilizations   5'   5'  5'                           There Ex        Rec bike L2 8 min (full rev)  L1 x 8'  L1 x 8'  1/2 revs x 8'  1/2 revs x 4'   Heel slides * 5\" x10    5\" x 10  5\" x 10   Gastroc strap stretch With Strap 20\"x4       Supine knee ext prop     1'                                            Neuro Re-Ed        Quad sets     HEP    SAQ        Supine SLR * With QS 2x10    X 20    Bridges        S/L hip abd x20    X 20    Prone hip ext        Heel/toe raises X20 ea    X 15 ea  X 20 ea   Band TKE Blue 5\" x20    Blue 5\" x 10  Blue 10\" x 10   Sit to stand chair with foam * No foam: 2x10     X 15   Sidestepping RTB 2 laps     X 2 laps   U/L leg press 55# x10  55 lbs x 10      Fwd step overs   1R x 10                      Re-evaluation    CM       Ther Act/Gait                                Modalities                                                                     "

## 2025-02-24 ENCOUNTER — OFFICE VISIT (OUTPATIENT)
Dept: PHYSICAL THERAPY | Facility: REHABILITATION | Age: 65
End: 2025-02-24
Payer: COMMERCIAL

## 2025-02-24 DIAGNOSIS — M17.11 OSTEOARTHRITIS OF RIGHT KNEE, UNSPECIFIED OSTEOARTHRITIS TYPE: Primary | ICD-10-CM

## 2025-02-24 DIAGNOSIS — Z96.651 HISTORY OF ARTHROPLASTY OF RIGHT KNEE: ICD-10-CM

## 2025-02-24 PROCEDURE — 97110 THERAPEUTIC EXERCISES: CPT | Performed by: PHYSICAL THERAPIST

## 2025-02-24 PROCEDURE — 97112 NEUROMUSCULAR REEDUCATION: CPT | Performed by: PHYSICAL THERAPIST

## 2025-02-24 PROCEDURE — 97140 MANUAL THERAPY 1/> REGIONS: CPT | Performed by: PHYSICAL THERAPIST

## 2025-02-24 NOTE — PROGRESS NOTES
"Daily Note     Today's date: 2025  Patient name: Nick Mcknight  : 1960  MRN: [id number]  Referring provider: Alejandro Tucker MD  Dx:   Encounter Diagnosis     ICD-10-CM    1. Osteoarthritis of right knee, unspecified osteoarthritis type  M17.11       2. History of arthroplasty of right knee  Z96.651           Start Time: 1400  Stop Time: 1445  Total time in clinic (min): 45 minutes    Subjective: Patient reports she has continued to do well. 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 Progressed resistance with band sidestepping and initiated waddle walks. Knee flexion and extension ROM full following manual therapy. Patient demonstrated fatigue post treatment, exhibited good technique with therapeutic exercises, and would benefit from continued PT      Plan: Continue per plan of care.        Diagnosis: s/p R TKA 25   Precautions: HTN   Primary impairments: R knee AROM/PROM deficits, R knee strength deficits, and gait dysfunction   *asterisks by exercise = given for HEP    2/10 2/17 2/20 2/24 2/3   Manuals        R knee PROM 15 min +Edema massage, HS stretch and patella glides  10'   10'  10'   Tibiofemoral and patellar mobilizations   5'   5'  5'                           There Ex        Rec bike L2 8 min (full rev)  L1 x 8'  L1 x 8'  L2 x 8'  1/2 revs x 4'   Heel slides * 5\" x10     5\" x 10   Gastroc strap stretch With Strap 20\"x4       Supine knee ext prop                                                Neuro Re-Ed        Supine SLR * With QS 2x10       Bridges        S/L hip abd x20       Sit to stand chair with foam * No foam: 2x10    HEP  X 15   Sidestepping and waddle walks     Green x 2 laps   Green x 1 lap    U/L leg press 55# x10  55 lbs x 10   65 lbs x 20    Fwd/lat step overs   1R x 10   2R x 15    Fwd static lunges     X 15 ea    SLS *     Reviewed                     Re-evaluation  "   CM      Ther Act/Gait                             Modalities

## 2025-02-26 ENCOUNTER — ANESTHESIA (OUTPATIENT)
Dept: GASTROENTEROLOGY | Facility: HOSPITAL | Age: 65
End: 2025-02-26
Payer: COMMERCIAL

## 2025-02-26 ENCOUNTER — HOSPITAL ENCOUNTER (OUTPATIENT)
Dept: GASTROENTEROLOGY | Facility: HOSPITAL | Age: 65
Setting detail: OUTPATIENT SURGERY
Discharge: HOME | End: 2025-02-26
Attending: INTERNAL MEDICINE
Payer: COMMERCIAL

## 2025-02-26 ENCOUNTER — ANESTHESIA EVENT (OUTPATIENT)
Dept: GASTROENTEROLOGY | Facility: HOSPITAL | Age: 65
End: 2025-02-26
Payer: COMMERCIAL

## 2025-02-26 VITALS
TEMPERATURE: 98.5 F | SYSTOLIC BLOOD PRESSURE: 142 MMHG | DIASTOLIC BLOOD PRESSURE: 78 MMHG | OXYGEN SATURATION: 98 % | RESPIRATION RATE: 16 BRPM | HEART RATE: 80 BPM

## 2025-02-26 DIAGNOSIS — Z12.11 SCREENING FOR COLON CANCER: ICD-10-CM

## 2025-02-26 DIAGNOSIS — K21.9 GASTROESOPHAGEAL REFLUX DISEASE, UNSPECIFIED WHETHER ESOPHAGITIS PRESENT: ICD-10-CM

## 2025-02-26 PROCEDURE — G0121 COLON CA SCRN NOT HI RSK IND: HCPCS | Performed by: INTERNAL MEDICINE

## 2025-02-26 PROCEDURE — 88305 TISSUE EXAM BY PATHOLOGIST: CPT | Performed by: PATHOLOGY

## 2025-02-26 PROCEDURE — 43239 EGD BIOPSY SINGLE/MULTIPLE: CPT | Performed by: INTERNAL MEDICINE

## 2025-02-26 RX ORDER — SODIUM CHLORIDE, SODIUM LACTATE, POTASSIUM CHLORIDE, CALCIUM CHLORIDE 600; 310; 30; 20 MG/100ML; MG/100ML; MG/100ML; MG/100ML
125 INJECTION, SOLUTION INTRAVENOUS CONTINUOUS
Status: DISCONTINUED | OUTPATIENT
Start: 2025-02-26 | End: 2025-03-02 | Stop reason: HOSPADM

## 2025-02-26 RX ORDER — PROPOFOL 10 MG/ML
INJECTION, EMULSION INTRAVENOUS AS NEEDED
Status: DISCONTINUED | OUTPATIENT
Start: 2025-02-26 | End: 2025-02-26

## 2025-02-26 RX ORDER — SODIUM CHLORIDE 9 MG/ML
INJECTION, SOLUTION INTRAVENOUS CONTINUOUS PRN
Status: DISCONTINUED | OUTPATIENT
Start: 2025-02-26 | End: 2025-02-26

## 2025-02-26 RX ADMIN — PROPOFOL 50 MG: 10 INJECTION, EMULSION INTRAVENOUS at 10:14

## 2025-02-26 RX ADMIN — PROPOFOL 50 MG: 10 INJECTION, EMULSION INTRAVENOUS at 10:05

## 2025-02-26 RX ADMIN — PROPOFOL 20 MG: 10 INJECTION, EMULSION INTRAVENOUS at 10:23

## 2025-02-26 RX ADMIN — PROPOFOL 50 MG: 10 INJECTION, EMULSION INTRAVENOUS at 10:10

## 2025-02-26 RX ADMIN — PROPOFOL 30 MG: 10 INJECTION, EMULSION INTRAVENOUS at 10:08

## 2025-02-26 RX ADMIN — SODIUM CHLORIDE: 0.9 INJECTION, SOLUTION INTRAVENOUS at 09:00

## 2025-02-26 RX ADMIN — PROPOFOL 50 MG: 10 INJECTION, EMULSION INTRAVENOUS at 10:20

## 2025-02-26 RX ADMIN — PROPOFOL 150 MG: 10 INJECTION, EMULSION INTRAVENOUS at 10:02

## 2025-02-26 NOTE — ANESTHESIA PREPROCEDURE EVALUATION
Procedure:  COLONOSCOPY  EGD    Relevant Problems   No relevant active problems        Physical Exam    Airway    Mallampati score: I  TM Distance: >3 FB  Neck ROM: full     Dental       Cardiovascular  Cardiovascular exam normal    Pulmonary  Pulmonary exam normal     Other Findings  post-pubertal.      Anesthesia Plan  ASA Score- 1     Anesthesia Type- IV sedation with anesthesia with ASA Monitors.         Additional Monitors:     Airway Plan:            Plan Factors-Exercise tolerance (METS): >4 METS.    Chart reviewed. EKG reviewed. Imaging results reviewed. Existing labs reviewed. Patient summary reviewed.                  Induction- intravenous.    Postoperative Plan- Plan for postoperative opioid use. Planned trial extubation        Informed Consent- Anesthetic plan and risks discussed with patient.  I personally reviewed this patient with the CRNA. Discussed and agreed on the Anesthesia Plan with the CRNA..      NPO Status:  Vitals Value Taken Time   Date of last liquid 02/26/25 02/26/25 0934   Time of last liquid 0400 02/26/25 0934   Date of last solid 02/24/25 02/26/25 0934   Time of last solid 1900 02/26/25 0934

## 2025-02-26 NOTE — H&P
History and Physical - [facility]  Nick Mcknight 64 y.o. female MRN: [id number]    HPI: Nick Mcknight is a 64 y.o. year old female who presents for GERD, colon cancer screening    REVIEW OF SYSTEMS: Per the HPI, and otherwise unremarkable.    Historical Information   Past Medical History:   Diagnosis Date    GERD (gastroesophageal reflux disease)     Hiatal hernia     Hypertension     Kidney stone 2015     Past Surgical History:   Procedure Laterality Date    ARTHROSCOPY KNEE Bilateral 2000    BACK SURGERY  2021    TOTAL KNEE ARTHROPLASTY Right 01/20/2025     Social History   Social History     Substance and Sexual Activity   Alcohol Use Yes    Comment: socially     Social History     Substance and Sexual Activity   Drug Use Never     Social History     Tobacco Use   Smoking Status Never   Smokeless Tobacco Never     Family History   Problem Relation Age of Onset    Colon cancer Neg Hx     Colon polyps Neg Hx        Meds/Allergies       Current Outpatient Medications:     amLODIPine (NORVASC) 2.5 mg tablet    famotidine (PEPCID) 20 mg tablet    losartan (COZAAR) 100 MG tablet    omeprazole (PriLOSEC) 20 mg delayed release capsule    mupirocin (BACTROBAN) 2 % ointment    polyethylene glycol (Golytely) 4000 mL solution    Current Facility-Administered Medications:     lactated ringers infusion, 125 mL/hr, Intravenous, Continuous    Facility-Administered Medications Ordered in Other Encounters:     sodium chloride 0.9 % infusion, , Intravenous, Continuous PRN, New Bag at 02/26/25 0900    Allergies   Allergen Reactions    Nuts - Food Allergy Itching and Rash    Penicillins Hives, Itching and Rash    Shellfish Allergy - Food Allergy Rash       Objective     /87   Pulse 90   Temp 97.9 °F (36.6 °C) (Temporal)   Resp 18   LMP  (Approximate)   SpO2 99%     PHYSICAL EXAM    Gen: NAD AAOx3  Head: Normocephalic, Atraumatic  CV: S1S2 RRR no m/r/g  CHEST: Clear b/l no c/r/w  ABD: soft, +BS  NT/ND  EXT: no edema    ASSESSMENT/PLAN:  This is a 64 y.o. year old female here for EGD/colonoscopy, and she is stable and optimized for her procedure.         Ovarian cyst/OB/GYN

## 2025-02-26 NOTE — ANESTHESIA POSTPROCEDURE EVALUATION
Post-Op Assessment Note    CV Status:  Stable  Pain Score: 0    Pain management: adequate       Mental Status:  Alert and awake   Hydration Status:  Euvolemic   PONV Controlled:  Controlled   Airway Patency:  Patent     Post Op Vitals Reviewed: Yes    No anethesia notable event occurred.    Staff: CRNA           Last Filed PACU Vitals:  Vitals Value Taken Time   Temp Rn temp    Pulse 84    /66    Resp 17    SpO2 95% 6Lo2 mask

## 2025-02-27 ENCOUNTER — TELEPHONE (OUTPATIENT)
Dept: GASTROENTEROLOGY | Facility: CLINIC | Age: 65
End: 2025-02-27

## 2025-02-27 ENCOUNTER — RESULTS FOLLOW-UP (OUTPATIENT)
Dept: GASTROENTEROLOGY | Facility: CLINIC | Age: 65
End: 2025-02-27

## 2025-02-27 DIAGNOSIS — K44.9 HIATAL HERNIA: ICD-10-CM

## 2025-02-27 DIAGNOSIS — K21.9 GASTROESOPHAGEAL REFLUX DISEASE, UNSPECIFIED WHETHER ESOPHAGITIS PRESENT: Primary | ICD-10-CM

## 2025-02-27 PROCEDURE — 88305 TISSUE EXAM BY PATHOLOGIST: CPT | Performed by: PATHOLOGY

## 2025-02-27 RX ORDER — FAMOTIDINE 40 MG/1
40 TABLET, FILM COATED ORAL 2 TIMES DAILY
Qty: 180 TABLET | Refills: 3 | Status: SHIPPED | OUTPATIENT
Start: 2025-02-27

## 2025-02-27 NOTE — TELEPHONE ENCOUNTER
Clerical-please arrange office visit with me in about 3 months    Called patient with negative biopsies from EGD    Daytime symptoms controlled but has regurgitation at night  Tried stopping omeprazole several weeks ago but had vomiting during the day    We will overlap with an H2 blocker, she will take famotidine 40 mg twice daily before breakfast and bedtime, prescription sent to pharmacy.  She will update me via the portal next week.  If symptoms are better controlled we will continue the famotidine and try tapering the PPI.

## 2025-03-04 ENCOUNTER — OFFICE VISIT (OUTPATIENT)
Dept: PHYSICAL THERAPY | Facility: REHABILITATION | Age: 65
End: 2025-03-04
Payer: COMMERCIAL

## 2025-03-04 DIAGNOSIS — Z96.651 HISTORY OF ARTHROPLASTY OF RIGHT KNEE: ICD-10-CM

## 2025-03-04 DIAGNOSIS — M17.11 OSTEOARTHRITIS OF RIGHT KNEE, UNSPECIFIED OSTEOARTHRITIS TYPE: Primary | ICD-10-CM

## 2025-03-04 PROCEDURE — 97112 NEUROMUSCULAR REEDUCATION: CPT | Performed by: PHYSICAL THERAPIST

## 2025-03-04 PROCEDURE — 97140 MANUAL THERAPY 1/> REGIONS: CPT | Performed by: PHYSICAL THERAPIST

## 2025-03-04 NOTE — PROGRESS NOTES
"Daily Note and Discharge    Today's date: 3/4/2025  Patient name: Nick Mcknight  : 1960  MRN: [id number]  Referring provider: Alejandro Tucker MD  Dx:   Encounter Diagnosis     ICD-10-CM    1. Osteoarthritis of right knee, unspecified osteoarthritis type  M17.11       2. History of arthroplasty of right knee  Z96.651           Start Time: 0815  Stop Time: 0900  Total time in clinic (min): 45 minutes    Subjective: Patient reports she has continued to do really well and she feels ready to transition to independent HEP after this visit      Objective: See treatment diary below      Assessment: Plan to discharge patient to independent HEP at this time as she has achieved functional goals. Knee AROM measured 0-123 degrees. Updated and reviewed HEP. Very good prognosis.      Plan:  Discharge to independent HEP       Diagnosis: s/p R TKA 25   Precautions: HTN   Primary impairments: R knee AROM/PROM deficits, R knee strength deficits, and gait dysfunction   *asterisks by exercise = given for HEP    2/10 2/17 2/20 2/24 3/4   Manuals        R knee PROM 15 min +Edema massage, HS stretch and patella glides  10'   10'  5'   Tibiofemoral and patellar mobilizations   5'   5'  5'                           There Ex        Rec bike L2 8 min (full rev)  L1 x 8'  L1 x 8'  L2 x 8'  L2 x 8'   Heel slides * 5\" x10       Gastroc strap stretch With Strap 20\"x4       Supine knee ext prop                                                Neuro Re-Ed        Supine SLR * With QS 2x10       Bridges        S/L hip abd x20       Sit to stand chair with foam * No foam: 2x10    HEP    Sidestepping and waddle walks *     Green x 2 laps   Green x 1 lap  Green x 2 laps   Green x 1 lap   U/L leg press 55# x10  55 lbs x 10   65 lbs x 20  75 lbs x 20   Fwd/lat step overs   1R x 10   2R x 15    Fwd static lunges     X 15 ea    SLS *     Reviewed                     Re-evaluation    CM     Ther Act/Gait                          Modalities          "

## 2025-07-16 ENCOUNTER — OFFICE VISIT (OUTPATIENT)
Dept: GASTROENTEROLOGY | Facility: CLINIC | Age: 65
End: 2025-07-16
Payer: COMMERCIAL

## 2025-07-16 VITALS
BODY MASS INDEX: 28.67 KG/M2 | SYSTOLIC BLOOD PRESSURE: 130 MMHG | HEIGHT: 66 IN | WEIGHT: 178.4 LBS | DIASTOLIC BLOOD PRESSURE: 86 MMHG

## 2025-07-16 DIAGNOSIS — K21.9 GASTROESOPHAGEAL REFLUX DISEASE WITHOUT ESOPHAGITIS: Primary | ICD-10-CM

## 2025-07-16 DIAGNOSIS — Z12.11 COLON CANCER SCREENING: ICD-10-CM

## 2025-07-16 DIAGNOSIS — K44.9 HIATAL HERNIA: ICD-10-CM

## 2025-07-16 PROCEDURE — 99214 OFFICE O/P EST MOD 30 MIN: CPT | Performed by: PHYSICIAN ASSISTANT

## 2025-07-16 NOTE — PROGRESS NOTES
Name: Nick Mcknight      : 1960      MRN: [id number]  Encounter Provider: Urszula Antoine PA-C  Encounter Date: 2025   Encounter department: [facility]  :  Assessment & Plan  Gastroesophageal reflux disease without esophagitis  Has longstanding reflux controlled with omeprazole 20 mg daily with recurrence of symptoms when stopping medication abruptly.  EGD 2025 showed a 5 cm hiatal hernia.  Dr. Muñoz recommended overlapping with Pepcid prior to trying to taper PPI.  Currently taking Pepcid 40 mg twice daily which she was advised to continue.  Currently taking omeprazole 20 daily in a.m. advised to start tapering to every other day x 2 weeks then every third day x 2 weeks then every fourth day x 2 weeks until stopped.  If symptoms are unable to be controlled on this regiment patient advised to contact office.  Patient is going to contact PCP to see if repeat kidney function can be done sooner to see if Cr improved in case PPI needs to be continued if needed.  If not, can consider evaluation for hiatal hernia repair to hopefully control acid reflux symptoms and not need future PPI.     -Continue famotidine 40 mg twice daily  -Omeprazole 20 mg taper as above  Hiatal hernia  As above       Colon cancer screening  Colonoscopy 2025 normal, recommend repeat in 10 years, due 2035.  Reviewed with patient.     Follow up: 3m OV Dr. Muñoz       History of Present Illness   HPI  Nick Mcknight is a 65 y.o. female with PMH CKD, HTN who presents in follow-up for reflux and hiatal hernia.  No family history of GI malignancy.    Evaluated with Dr. Young 2025 for chronic reflux on PPI and Pepcid in setting of new CKD  and HH on CXR, EGD ordered, also due for screening colonoscopy.    2025 EGD showed gastritis, 5 cm hiatal hernia, 12 mm small salmon-colored mucosa, gastric and esophageal biopsies negative.  Advised to take Pepcid 40 mg twice daily and then  "try tapering PPI if controlled as stopping abruptly flared symptoms  2/2025 colonoscopy with BBPS 8 showed hemorrhoids and diverticulosis recommend repeat in 10 years.    1/2025 CBC normal.  CMP showed creatinine 1.09 GFR 57.    I am happy to hear her reflux is well-controlled with omeprazole 20 mg in the morning along with famotidine 20 mg twice daily.  Denies NSAID use.  Has not had kidney function rechecked and is not due until 12/2025 which is being managed by PCP.  She does not see nephrologist and reports this was possibly a result of meds/Celebrex use per patient.  She did try stopping PPI abruptly a month ago while on Pepcid and had recurrent reflux and intermittent vomiting.  Admits to daily BM's without bleeding.  Eating well and weight stable.  Otherwise denies all other GI and constitutional symptoms.    Objective   /86   Ht 5' 6\" (1.676 m)   Wt 80.9 kg (178 lb 6.4 oz)   BMI 28.79 kg/m²      Physical Exam  Constitutional: Well-developed, no acute distress  HEENT: normocephalic, mucous membranes moist.  Neck: Supple  Skin: warm and dry  Respiratory: Lungs are clear to auscultation B/L.  Cardiovascular: Heart is regular rate and rhythm.  Gastrointestinal: Soft, nontender, nondistended with normal active bowel sounds.  No masses, guarding, rebound.   Rectal Exam: Deferred.  Extremities: No edema.  Neurologic: Nonfocal. A & O ×3.   Psychiatric: Normal affect.    "

## 2025-07-16 NOTE — PATIENT INSTRUCTIONS
- Antireflux regiment reviewed  -Will stay on Pepcid 40 mg twice daily and try tapering omeprazole 20 daily to every other day x 2 weeks then every third day then every fourth day and such until stopping.  -If symptoms flare and unable to be controlled consider if low dose PPI okay in setting of CKD versus assessing for HH repair etc  -pt will update us via portal  - OV Dr. Muñoz